# Patient Record
Sex: FEMALE | Race: WHITE | URBAN - METROPOLITAN AREA
[De-identification: names, ages, dates, MRNs, and addresses within clinical notes are randomized per-mention and may not be internally consistent; named-entity substitution may affect disease eponyms.]

---

## 2019-05-10 ENCOUNTER — TELEPHONE (OUTPATIENT)
Dept: TRANSPLANT | Facility: CLINIC | Age: 24
End: 2019-05-10

## 2019-05-10 DIAGNOSIS — Z00.5 TRANSPLANT DONOR EVALUATION: Primary | ICD-10-CM

## 2019-05-10 NOTE — TELEPHONE ENCOUNTER
Is abo O. Needs to think over PEP.Hx Ruen Y Gastric Bypass 12/17.Understands need for [employer] eventually.Will send forms/orders.

## 2019-05-10 NOTE — TELEPHONE ENCOUNTER
"[facility] BREEZE  [id number]      LIVING KIDNEY DONOR EVALUATION  Donor First Name Brissa Donor MRN    Donor Last Name Viola Completed 2019 10:09 AM    1995 Record ID [id number]   BREEZE Screen PASSED     Intended Recipient  Recipient First Name Allan Recipient MRN    Recipient Last Name Ginny Relationship Not related (Other)   Recipient   Recipient Diagnosis    Recipient's ABO      Donor Information  Age 23 Gender Female   Ht 168 cm (5' 6'') Race Black or ,    Wt 97.9 kg (216 lbs) Ethnicity Not /   BMI 34.90 kg/m  Preferred Language English      Required No     Blood Type O   Demographics  Home Address [address] [place] # [phone]   [facility] Type [place]   State [facility] #    Zip Code [address] Type    Country [place] Preferred Contact day Mon, Fri, Th, Wed, Tue   Email [email or web address] Preferred Contact time 09:00 AM-11:00 AM   &&   Donor's Medical Information  Medical History Fibromyalgia Medications None Reported   Surgical History Appendectomy      Cholecystectomy   Gastric Bypass, NOS   Tubal Ligation   Uterine and/or Ovarian Surgery, NOS Allergies Bactrim : Rash   Can only use paper tape : other (Not allergic to it)   Ceclor : Rash   Gabapentin : Edema, other (Nose bleeds)   Tape : Rash   Social History EtOH: Denies   Illicit Drug Use: Denies   Tobacco: Remote; Quit ; (1/2 ppd x 14 years) Self-Reported Functional Status \"I am able to participate in strenuous sports such as swimming, singles tennis, football, basketball, or skiing\"   Family Medical History Cancer (denies)   Diabetes (denies)   Heart Disease (denies)   Hypertension (denies)   Kidney Disease (denies)   Kidney Stones (denies) Exercise Frequency Exercise (>3X per week)   Review of Organ Systems  Review of Systems Airway or Lungs: No   Blood Disorder: No   Cancer: No   Diabetes,Thyroid,Adrenal,Endocrine Disorder: No   Digestive or " Liver: No   Female Health: No   Heart or Circulatory System: No   Immune Diseases: No   Kidneys and Bladder: No   Muscles,Bones,Joints: Yes   Neuro: No   Psych: No   &&   Donor's Social Information  Marital Status  Living Accommodation Lives in rented accommodation   Level of Education Some college education Living Arrangement With spouse   Employment Status Full Time Concerns: health and life insurance No   Employer [employer] Concerns: job security and lost income No   Occupation      Medical Insurance Status Has medical insurance     High Risk Behavior  High Risk Behaviors Blood transfusion < 12 months. (NO)   Commercial sex < 12 months. (NO)   Illicit IV drug use < 5yrs. (NO)   Other high risk sexual contact < 12 months. (NO)   Reason for Donation  Referral Friend or Family of Tx Candidate Reason for Donation I am wanting to be a blessing to a co-workers family member who is in need if I am able to..   Permission to Disclose Inquiry Yes Patient Comments    Donor Motivation Level Highly motivated donor     PCP Contact  PCP Name    PCP [facility]    PCP State    PCP Phone    Emergency Contact  First Name Darrin First Name Re   Last Name Guy Last Name Penny   Phone # [phone] Phone # [phone]   Phone Type Mobile Phone Type Mobile   Relationship Friend or Other Relationship Sibling   Office Use  Reviewed By    Reviewed 5/10/2019 11:07 AM   Admin Folder Archive   Comments    Lost for Followup    Extended Comments    BREEZE ID [email or web address]:[id number] survey status completed   Activity History  Call  Task    Due Date 5/10/2019   Last Modified Date/Time 5/10/2019 9:50 AM   Comments    Call  Task    Due Date 5/9/2019   Last Modified Date/Time 5/9/2019 11:01 AM   Comments    Call  Task    Due Date 5/7/2019   Last Modified Date/Time 5/7/2019 8:54 AM   Comments